# Patient Record
Sex: FEMALE | Race: WHITE | NOT HISPANIC OR LATINO | URBAN - METROPOLITAN AREA
[De-identification: names, ages, dates, MRNs, and addresses within clinical notes are randomized per-mention and may not be internally consistent; named-entity substitution may affect disease eponyms.]

---

## 2024-01-15 ENCOUNTER — TRANSFERRED RECORDS (OUTPATIENT)
Dept: HEALTH INFORMATION MANAGEMENT | Facility: CLINIC | Age: 24
End: 2024-01-15

## 2024-01-27 ENCOUNTER — TRANSFERRED RECORDS (OUTPATIENT)
Dept: HEALTH INFORMATION MANAGEMENT | Facility: CLINIC | Age: 24
End: 2024-01-27

## 2024-01-30 ENCOUNTER — MEDICAL CORRESPONDENCE (OUTPATIENT)
Dept: HEALTH INFORMATION MANAGEMENT | Facility: CLINIC | Age: 24
End: 2024-01-30

## 2024-01-31 ENCOUNTER — TRANSCRIBE ORDERS (OUTPATIENT)
Dept: OTHER | Age: 24
End: 2024-01-31

## 2024-01-31 DIAGNOSIS — H53.9 VISUAL DISTURBANCE: ICD-10-CM

## 2024-01-31 DIAGNOSIS — G93.2 INTRACRANIAL PRESSURE INCREASED: Primary | ICD-10-CM

## 2024-01-31 DIAGNOSIS — H93.A3 PULSATILE TINNITUS, BILATERAL: ICD-10-CM

## 2024-01-31 DIAGNOSIS — R40.4 NONSPECIFIC PAROXYSMAL SPELL: ICD-10-CM

## 2024-02-01 NOTE — TELEPHONE ENCOUNTER
FUTURE VISIT INFORMATION      FUTURE VISIT INFORMATION:  Date: 2/21/24  Time: 7:15am  Location: [facility]  REFERRAL INFORMATION:  Referring provider:  Serg Cat MD   Referring providers clinic:  [facility]   Reason for visit/diagnosis  Intracranial pressure increased [G93.2]Visual disturbance [H53.9]  Pulsatile tinnitus, bilateral [H93.A3]Nonspecific paroxysmal spell    RECORDS REQUESTED FROM:       Clinic name Comments Records Status Imaging Status   [facility]  Recs scanned into chart 1/15/24 [facility]    Imaging MRI head done 1/27/24- Amb[facility]- requested images- resent 2/13- saved to PACS [facility]

## 2024-02-19 ENCOUNTER — TELEPHONE (OUTPATIENT)
Dept: OPHTHALMOLOGY | Facility: CLINIC | Age: 24
End: 2024-02-19
Payer: COMMERCIAL

## 2024-02-19 NOTE — PROGRESS NOTES
Debbie Warner is a 23 year old female with the following diagnoses:   1. Subjective vision disturbance    2. Pre-syncope         Patient was sent for consultation by Dr. Cat for papilledema rule out.    HPI:    Patient presented to neurology clinic with vision changes for over 1 year. She reports that these symptoms started a couple years ago. They initially thought it was long-covid. She describes difficulty with depth perception, blurring of vision in and out, lightheadedness. It sometimes feels like she is going to pass out and her vision goes dark but then she snaps back in and doesn't pass out. She occasionally has black dots/blobs in her vision that pass in and out of vision. She sees gold specks in her vision as well. These symptoms typically last for anywhere from 3-5 seconds but can occur once or 20 times in an hour or could be 5x/day. The specks are in the LEFT eye.  Overall, they are the same.  They are not associated with headaches. She thought it may be related to low blood sugar but eating a snack as not helped. She sometimes feels like she can hear the fluid moving in her brain but does not have whooshing in her ears that does not go with her heartbeat. She rarely has high pitched ringing. She rarely hears the thudding of her heartbeat.      Independent historians:  None    Review of outside testing:    MRI brain WO contrast 1/27/24:      MRV 1/27/4:    My interpretation performed today of outside testing:  The MRI does not show a partially empty sella, widened perioptic space, or flattened globes. One of of the transverse sinuses is stenotic.          Review of outside clinical notes:     -- Visit with Dr. Cat 1/15/24:      Past medical history:  None      Medications:   Nexplanon birth control implant      Family history / social history:  Patient's mom has a freckle behind her eye.     She does not drink or smoke. She is a .        Exam:  Visual acuity 20/20 right eye 20/20  left eye.  Color vision 11/11 right eye and 11/11 left eye.  Pupils normal without APD.  Intraocular pressure 11 right eye and 14 left eye.  Anterior segment exam unremarkable.  Fundus exam without evidence of optic disc edema or pallor.      Tests ordered and interpreted today:  None      Discussion of management / interpretation with another provider:   None    Assessment/Plan:   This patient does not meet the diagnostic criteria for idiopathic intracranial hypertension.  She neither has papilledema nor has a VI nerve palsy.  I reviewed her MRI images personally and she does not have 3 out of 4 imaging findings consistent with increased intracranial pressure.    Her symptoms of seeing gold hays in her vision associated with lightheadedness sound most consistent with presyncope.  She indicates to me that her heart rate runs in the 50s.  She is wearing a smart watch and while we were calmly discussing her situation her heart rate suddenly jumped up into the mid 80s.  She told me that this is common and that her heart rate will drop back down into the 50s in a couple of minutes.  She was not symptomatic at that time.  I think she should undergo a workup for possible presyncope and whether this might be cardiogenic or not.  She will discuss this with her primary care doctor.         Attending Physician Attestation:  Complete documentation of historical and exam elements from today's encounter can be found in the full encounter summary report (not reduplicated in this progress note).  I personally obtained the chief complaint(s) and history of present illness.  I confirmed and edited as necessary the review of systems, past medical/surgical history, family history, social history, and examination findings as documented by others; and I examined the patient myself.  I personally reviewed the relevant tests, images, and reports as documented above.  I formulated and edited as necessary the assessment and plan and  discussed the findings and management plan with the patient and family. - Tomas Moseley MD  Resident Physician, PGY-3  Department of Ophthalmology

## 2024-02-21 ENCOUNTER — PRE VISIT (OUTPATIENT)
Dept: OPHTHALMOLOGY | Facility: CLINIC | Age: 24
End: 2024-02-21

## 2024-02-21 ENCOUNTER — OFFICE VISIT (OUTPATIENT)
Dept: OPHTHALMOLOGY | Facility: CLINIC | Age: 24
End: 2024-02-21
Attending: PSYCHIATRY & NEUROLOGY
Payer: COMMERCIAL

## 2024-02-21 DIAGNOSIS — H53.10 SUBJECTIVE VISUAL DISTURBANCE: Primary | ICD-10-CM

## 2024-02-21 DIAGNOSIS — R55 PRE-SYNCOPE: ICD-10-CM

## 2024-02-21 DIAGNOSIS — H53.10 SUBJECTIVE VISION DISTURBANCE: Primary | ICD-10-CM

## 2024-02-21 PROCEDURE — 99204 OFFICE O/P NEW MOD 45 MIN: CPT | Mod: GC | Performed by: OPHTHALMOLOGY

## 2024-02-21 RX ORDER — FLUTICASONE PROPIONATE 50 MCG
1 SPRAY, SUSPENSION (ML) NASAL DAILY
COMMUNITY

## 2024-02-21 ASSESSMENT — TONOMETRY
IOP_METHOD: ICARE
OS_IOP_MMHG: 14
OD_IOP_MMHG: 11

## 2024-02-21 ASSESSMENT — CONF VISUAL FIELD
OS_SUPERIOR_TEMPORAL_RESTRICTION: 0
OS_INFERIOR_NASAL_RESTRICTION: 0
METHOD: COUNTING FINGERS
OD_NORMAL: 1
OD_SUPERIOR_NASAL_RESTRICTION: 0
OD_INFERIOR_NASAL_RESTRICTION: 0
OD_SUPERIOR_TEMPORAL_RESTRICTION: 0
OD_INFERIOR_TEMPORAL_RESTRICTION: 0
OS_SUPERIOR_NASAL_RESTRICTION: 0
OS_INFERIOR_TEMPORAL_RESTRICTION: 0
OS_NORMAL: 1

## 2024-02-21 ASSESSMENT — CUP TO DISC RATIO
OD_RATIO: 0.1
OS_RATIO: 0.1

## 2024-02-21 ASSESSMENT — REFRACTION_WEARINGRX
OD_SPHERE: -1.00
OD_AXIS: 083
OD_CYLINDER: +1.50
OS_SPHERE: -0.50
OS_AXIS: 089
OS_CYLINDER: +1.50

## 2024-02-21 ASSESSMENT — VISUAL ACUITY
OD_CC: 20/20
OS_CC: 20/20
METHOD: SNELLEN - LINEAR
CORRECTION_TYPE: GLASSES

## 2024-02-21 ASSESSMENT — SLIT LAMP EXAM - LIDS
COMMENTS: NORMAL
COMMENTS: NORMAL

## 2024-02-21 ASSESSMENT — EXTERNAL EXAM - LEFT EYE: OS_EXAM: NORMAL

## 2024-02-21 ASSESSMENT — EXTERNAL EXAM - RIGHT EYE: OD_EXAM: NORMAL

## 2024-02-21 NOTE — NURSING NOTE
Chief Complaints and History of Present Illnesses   Patient presents with    Pseudotumor Evaluation     Chief Complaint(s) and History of Present Illness(es)       Pseudotumor Evaluation              Laterality: both eyes    Onset: 2 years ago    Severity: moderate    Frequency: intermittently    Treatments tried: no treatments    Response to treatment: no improvement              Comments    Debbie Warner is being seen for a consult today by the request of Dr. Cat for concern of IIH.  For the last 2 years. Will have scotomas and develop photopsias.  Denies associated headaches.  The scotoma will last just a few seconds but can occur one right after another.  They occur every 1-2 days.  Photopsias last similar amount of time.  Will also feel fluid moving through veins, like bubbles chugging in water bottle.  This will occur  less frequently.  Symptoms have been stable since onset.  MRI was done 1/27/2024 at [facility] in [place].   EEG was also done 2/15/24 also at [facility].     Sylvia Parmar on 2/21/2024 at 7:07 AM

## 2024-02-21 NOTE — LETTER
2024         RE:  :  MRN: Debbie Warner  2000  [id number]     Dear Dr. Serg Cat,    Thank you for asking me to see your very pleasant patient, Debbie Warner, in neuro-ophthalmic consultation.  I would like to thank you for sending your records and I have summarized them in the history of present illness. My assessment and plan are below.  For further details, please see my attached clinic note.      Debbie Warner is a 23 year old female with the following diagnoses:   1. Subjective vision disturbance    2. Pre-syncope         Patient was sent for consultation by Dr. Cat for papilledema rule out.    HPI:    Patient presented to neurology clinic with vision changes for over 1 year. She reports that these symptoms started a couple years ago. They initially thought it was long-covid. She describes difficulty with depth perception, blurring of vision in and out, lightheadedness. It sometimes feels like she is going to pass out and her vision goes dark but then she snaps back in and doesn't pass out. She occasionally has black dots/blobs in her vision that pass in and out of vision. She sees gold specks in her vision as well. These symptoms typically last for anywhere from 3-5 seconds but can occur once or 20 times in an hour or could be 5x/day. The specks are in the LEFT eye.  Overall, they are the same.  They are not associated with headaches. She thought it may be related to low blood sugar but eating a snack as not helped. She sometimes feels like she can hear the fluid moving in her brain but does not have whooshing in her ears that does not go with her heartbeat. She rarely has high pitched ringing. She rarely hears the thudding of her heartbeat.      Independent historians: None    Review of outside testing:    MRI brain WO contrast 24:      MRV :    My interpretation performed today of outside testing:  The MRI does not show a partially empty sella, widened perioptic space, or  flattened globes. One of of the transverse sinuses is stenotic.      Review of outside clinical notes:     -- Visit with Dr. Cat 1/15/24:      Past medical history: None    Medications:   Nexplanon birth control implant      Family history / social history:  Patient's mom has a freckle behind her eye.     She does not drink or smoke. She is a .        Exam:  Visual acuity 20/20 right eye 20/20 left eye.  Color vision 11/11 right eye and 11/11 left eye.  Pupils normal without APD.  Intraocular pressure 11 right eye and 14 left eye.  Anterior segment exam unremarkable.  Fundus exam without evidence of optic disc edema or pallor.      Tests ordered and interpreted today: None    Discussion of management / interpretation with another provider: None    Assessment/Plan:   This patient does not meet the diagnostic criteria for idiopathic intracranial hypertension.  She neither has papilledema nor has a VI nerve palsy.  I reviewed her MRI images personally and she does not have 3 out of 4 imaging findings consistent with increased intracranial pressure.    Her symptoms of seeing gold hays in her vision associated with lightheadedness sound most consistent with presyncope.  She indicates to me that her heart rate runs in the 50s.  She is wearing a smart watch and while we were calmly discussing her situation her heart rate suddenly jumped up into the mid 80s.  She told me that this is common and that her heart rate will drop back down into the 50s in a couple of minutes.  She was not symptomatic at that time.  I think she should undergo a workup for possible presyncope and whether this might be cardiogenic or not.  She will discuss this with her primary care doctor.      Again, thank you for allowing me to participate in the care of your patient.      Sincerely,    Tomas Florentino MD  Professor  Ophthalmology Residency   Director of Neuro-Ophthalmology  [facility]  Chair  Departments of Ophthalmology, Neurology, and Neurosurgery  [facility] [address] [place]  [address]  T - [phone]  F - [phone]  CATHERINE [email or web address]      CC: Serg Cat MD  [facility] [address] [place] [id number]  Via Fax: [phone]